# Patient Record
Sex: FEMALE | Race: BLACK OR AFRICAN AMERICAN | NOT HISPANIC OR LATINO | Employment: UNEMPLOYED | URBAN - METROPOLITAN AREA
[De-identification: names, ages, dates, MRNs, and addresses within clinical notes are randomized per-mention and may not be internally consistent; named-entity substitution may affect disease eponyms.]

---

## 2019-08-28 ENCOUNTER — HOSPITAL ENCOUNTER (EMERGENCY)
Facility: CLINIC | Age: 49
Discharge: HOME | End: 2019-08-28
Attending: EMERGENCY MEDICINE | Admitting: EMERGENCY MEDICINE
Payer: COMMERCIAL

## 2019-08-28 VITALS
TEMPERATURE: 98.4 F | OXYGEN SATURATION: 100 % | SYSTOLIC BLOOD PRESSURE: 100 MMHG | RESPIRATION RATE: 18 BRPM | DIASTOLIC BLOOD PRESSURE: 61 MMHG | HEART RATE: 65 BPM

## 2019-08-28 DIAGNOSIS — R42 DIZZINESS: ICD-10-CM

## 2019-08-28 DIAGNOSIS — R42 VERTIGO: ICD-10-CM

## 2019-08-28 LAB
ABO + RH BLD: NORMAL
ABO + RH BLD: NORMAL
ANION GAP SERPL CALCULATED.3IONS-SCNC: 6 MMOL/L (ref 3–14)
BASOPHILS # BLD AUTO: 0 10E9/L (ref 0–0.2)
BASOPHILS NFR BLD AUTO: 0.5 %
BLD GP AB SCN SERPL QL: NORMAL
BLOOD BANK CMNT PATIENT-IMP: NORMAL
BUN SERPL-MCNC: 9 MG/DL (ref 7–30)
CALCIUM SERPL-MCNC: 8.7 MG/DL (ref 8.5–10.1)
CHLORIDE SERPL-SCNC: 108 MMOL/L (ref 94–109)
CO2 SERPL-SCNC: 25 MMOL/L (ref 20–32)
CREAT SERPL-MCNC: 0.54 MG/DL (ref 0.52–1.04)
DIFFERENTIAL METHOD BLD: NORMAL
EOSINOPHIL # BLD AUTO: 0.5 10E9/L (ref 0–0.7)
EOSINOPHIL NFR BLD AUTO: 6.9 %
ERYTHROCYTE [DISTWIDTH] IN BLOOD BY AUTOMATED COUNT: 12.6 % (ref 10–15)
GFR SERPL CREATININE-BSD FRML MDRD: >90 ML/MIN/{1.73_M2}
GLUCOSE SERPL-MCNC: 100 MG/DL (ref 70–99)
HCG SERPL QL: NEGATIVE
HCT VFR BLD AUTO: 38.2 % (ref 35–47)
HGB BLD-MCNC: 12.3 G/DL (ref 11.7–15.7)
IMM GRANULOCYTES # BLD: 0 10E9/L (ref 0–0.4)
IMM GRANULOCYTES NFR BLD: 0.3 %
INTERPRETATION ECG - MUSE: NORMAL
LYMPHOCYTES # BLD AUTO: 2.5 10E9/L (ref 0.8–5.3)
LYMPHOCYTES NFR BLD AUTO: 31.2 %
MCH RBC QN AUTO: 27.9 PG (ref 26.5–33)
MCHC RBC AUTO-ENTMCNC: 32.2 G/DL (ref 31.5–36.5)
MCV RBC AUTO: 87 FL (ref 78–100)
MONOCYTES # BLD AUTO: 0.6 10E9/L (ref 0–1.3)
MONOCYTES NFR BLD AUTO: 8 %
NEUTROPHILS # BLD AUTO: 4.2 10E9/L (ref 1.6–8.3)
NEUTROPHILS NFR BLD AUTO: 53.1 %
NRBC # BLD AUTO: 0 10*3/UL
NRBC BLD AUTO-RTO: 0 /100
PLATELET # BLD AUTO: 282 10E9/L (ref 150–450)
POTASSIUM SERPL-SCNC: 3.8 MMOL/L (ref 3.4–5.3)
RBC # BLD AUTO: 4.41 10E12/L (ref 3.8–5.2)
SODIUM SERPL-SCNC: 139 MMOL/L (ref 133–144)
SPECIMEN EXP DATE BLD: NORMAL
TROPONIN I SERPL-MCNC: <0.015 UG/L (ref 0–0.04)
TSH SERPL DL<=0.005 MIU/L-ACNC: 3.29 MU/L (ref 0.4–4)
WBC # BLD AUTO: 7.9 10E9/L (ref 4–11)

## 2019-08-28 PROCEDURE — 86900 BLOOD TYPING SEROLOGIC ABO: CPT | Performed by: EMERGENCY MEDICINE

## 2019-08-28 PROCEDURE — 80048 BASIC METABOLIC PNL TOTAL CA: CPT | Performed by: EMERGENCY MEDICINE

## 2019-08-28 PROCEDURE — 93005 ELECTROCARDIOGRAM TRACING: CPT

## 2019-08-28 PROCEDURE — 84443 ASSAY THYROID STIM HORMONE: CPT | Performed by: EMERGENCY MEDICINE

## 2019-08-28 PROCEDURE — 25000132 ZZH RX MED GY IP 250 OP 250 PS 637: Performed by: EMERGENCY MEDICINE

## 2019-08-28 PROCEDURE — 96361 HYDRATE IV INFUSION ADD-ON: CPT

## 2019-08-28 PROCEDURE — 96374 THER/PROPH/DIAG INJ IV PUSH: CPT

## 2019-08-28 PROCEDURE — 99284 EMERGENCY DEPT VISIT MOD MDM: CPT | Mod: 25

## 2019-08-28 PROCEDURE — 84703 CHORIONIC GONADOTROPIN ASSAY: CPT | Performed by: EMERGENCY MEDICINE

## 2019-08-28 PROCEDURE — 86901 BLOOD TYPING SEROLOGIC RH(D): CPT | Performed by: EMERGENCY MEDICINE

## 2019-08-28 PROCEDURE — 25000128 H RX IP 250 OP 636: Performed by: EMERGENCY MEDICINE

## 2019-08-28 PROCEDURE — 85025 COMPLETE CBC W/AUTO DIFF WBC: CPT | Performed by: EMERGENCY MEDICINE

## 2019-08-28 PROCEDURE — 86850 RBC ANTIBODY SCREEN: CPT | Performed by: EMERGENCY MEDICINE

## 2019-08-28 PROCEDURE — 84484 ASSAY OF TROPONIN QUANT: CPT | Performed by: EMERGENCY MEDICINE

## 2019-08-28 RX ORDER — ONDANSETRON 2 MG/ML
4 INJECTION INTRAMUSCULAR; INTRAVENOUS ONCE
Status: COMPLETED | OUTPATIENT
Start: 2019-08-28 | End: 2019-08-28

## 2019-08-28 RX ORDER — MECLIZINE HYDROCHLORIDE 25 MG/1
50 TABLET ORAL ONCE
Status: COMPLETED | OUTPATIENT
Start: 2019-08-28 | End: 2019-08-28

## 2019-08-28 RX ORDER — MECLIZINE HYDROCHLORIDE 25 MG/1
25-50 TABLET ORAL 3 TIMES DAILY PRN
Qty: 30 TABLET | Refills: 1 | Status: SHIPPED | OUTPATIENT
Start: 2019-08-28

## 2019-08-28 RX ORDER — ONDANSETRON 4 MG/1
4 TABLET, ORALLY DISINTEGRATING ORAL EVERY 8 HOURS PRN
Qty: 10 TABLET | Refills: 0 | Status: SHIPPED | OUTPATIENT
Start: 2019-08-28 | End: 2022-02-07

## 2019-08-28 RX ADMIN — SODIUM CHLORIDE 1000 ML: 9 INJECTION, SOLUTION INTRAVENOUS at 07:28

## 2019-08-28 RX ADMIN — ONDANSETRON HYDROCHLORIDE 4 MG: 2 INJECTION, SOLUTION INTRAMUSCULAR; INTRAVENOUS at 07:28

## 2019-08-28 RX ADMIN — MECLIZINE HYDROCHLORIDE 50 MG: 25 TABLET ORAL at 07:30

## 2019-08-28 ASSESSMENT — ENCOUNTER SYMPTOMS
FEVER: 0
DYSURIA: 0
DIARRHEA: 0
ABDOMINAL PAIN: 0
VOMITING: 0
DIZZINESS: 1
SHORTNESS OF BREATH: 1
WEAKNESS: 1

## 2019-08-28 NOTE — ED AVS SNAPSHOT
[facility] Emergency Department  [address]  [facility] [address]  Phone:  [phone]  Fax:  [phone]                                    Frankie Elliott   MRN: [id number]    Department:  [facility] Emergency Department   Date of Visit:  8/28/2019           After Visit Summary Signature Page    I have received my discharge instructions, and my questions have been answered. I have discussed any challenges I see with this plan with the nurse or doctor.    ..........................................................................................................................................  Patient/Patient Representative Signature      ..........................................................................................................................................  Patient Representative Print Name and Relationship to Patient    ..................................................               ................................................  Date                                   Time    ..........................................................................................................................................  Reviewed by Signature/Title    ...................................................              ..............................................  Date                                               Time          22EPIC Rev 08/18

## 2019-08-28 NOTE — ED PROVIDER NOTES
History     Chief Complaint:  Dizziness      HPI   Frankie Elliott is a 49 year old female who presents with Dizziness.       Allergies:  No known drug allergies      Medications:    Meclizine  Naproxen     Past Medical History:    Vertigo    Past Surgical History:    The patient does not have any pertinent past surgical history.     Family History:    No past pertinent family history.     Social History:  Smoking status: Not on file  Alcohol use: Not on file   Drug use: Not on file   PCP: No primary care provider on file.  Presents to the ED with ***  Marital Status:         Review of Systems  ***    Physical Exam     Patient Vitals for the past 24 hrs:   BP Temp Temp src Pulse Heart Rate Resp SpO2   08/28/19 0639 96/66 98.4  F (36.9  C) Oral 78 78 18 100 %        Physical Exam  ***  Constitutional: Patient is well appearing. No distress.  Head: Atraumatic.  Mouth/Throat: Oropharynx is clear and moist. No oropharyngeal exudate.  Eyes: Conjunctivae and EOM are normal. No scleral icterus.  Neck: Normal range of motion. Neck supple.   Cardiovascular: Normal rate, regular rhythm, normal heart sounds and intact distal pulses.   Pulmonary/Chest: Breath sounds normal. No respiratory distress.  Abdominal: Soft. Bowel sounds are normal. No distension. No tenderness. No rebound or guarding.   Musculoskeletal: Normal range of motion. No edema or tenderness.   Neurological: Alert and orientated to person, place, and time. No observable focal neuro deficit  Skin: Warm and dry. No rash noted. Not diaphoretic.       Emergency Department Course     ECG:  ECG taken at 0650, ECG read at 0650  Normal sinus rhythm  Low voltage QRS  Borderline ECG  Rate 73 bpm. NC interval 150 ms. QRS duration 70 ms. QT/QTc 384/423 ms. P-R-T axes 45 40 23.    Imaging:  Radiology findings were communicated with the *** who voiced understanding of the findings.    No orders to display     Laboratory:  Laboratory findings were communicated with the ***  who voiced understanding of the findings.    ***  CBC: WBC ***, HGB ***, PLT ***  ***MP: *** o/w WNL (Creatinine ***)  ***    Procedures  ***    Interventions:  ***  Medications   0.9% sodium chloride BOLUS (has no administration in time range)     Emergency Department Course:   Nursing notes and vitals reviewed.    *** I performed an exam of the patient as documented above.     *** ***    *** ***    *** I personally reviewed the *** results with the *** and answered all related questions prior to ***.    Impression & Plan      Medical Decision Making:  Frankie Elliott is a 49 year old female who presents to the emergency department today for evaluation of ***    Diagnosis:  ***  No diagnosis found.  Disposition:   ***    Discharge Medications:  ***  New Prescriptions    No medications on file       Scribe Disclosure:  I, Margie Myers, am serving as a scribe at *** on 8/28/2019 to document services personally performed by Calixto Cr MD based on my observations and the provider's statements to me.  [facility] EMERGENCY DEPARTMENT

## 2019-08-28 NOTE — ED TRIAGE NOTES
Dizziness started about 1 month ago, comes and goes. Palpitations when feeling dizzy. ABCs intact.

## 2019-08-28 NOTE — DISCHARGE INSTRUCTIONS
Discharge Instructions  Vertigo  You have been diagnosed with vertigo.  This is a dizzy feeling often described as spinning or that the room is moving around you. You will often have nausea (sick to your stomach), vomiting (throwing up), and balance problems with it.  Vertigo is usually caused by a problem in the inner ear which helps control your balance.  Many things can cause vertigo, including calcium collections in the inner ear, a virus infection of the inner ear, concussion, migraine, and some medicines.  Luckily, these causes are not life threatening and will eventually go away.  However, sometimes there is a serious problem that does not show up right away.  Generally, every Emergency Department visit should have a follow-up clinic visit with either a primary or a specialty clinic/provider. Please follow-up as instructed by your emergency provider today.  Return to the Emergency Department if you have:  New or severe headache.  Double vision (seeing two of things).  Trouble speaking or hearing.  Weakness or trouble moving/using one side of your body.  Passing out.  Numbness or tingling.  Chest pain.  Vomiting that will not stop.    Treatment:  There are several commonly prescribed medications:  Antihistamines such as meclizine (Antivert ), dimenhydrinate (Dramamine ), or diphenhydramine (Benadryl ).  Prescription anti-nausea medicines, such as promethazine (Phenergan ), metoclopramide (Reglan ), or ondansetron (Zofran ).  Prescription sedative medicines, such as diazepam (Valium ), lorazepam (Ativan ), or clonazepam (Klonopin ).  Most of these medicines make you sleepy, and you should not take them before you work or drive. You should only take prescription medicines to treat severe vertigo symptoms, and you should stop the medicine when your symptoms improve.    Follow Up:  If you have vertigo longer than three days, it is important that you follow up either with your primary provider or an Ear, Nose, and  Throat (ENT) specialist.  You may need further testing to evaluate your vertigo and you may also need  vestibular  therapy which is a special form of physical therapy to make the vertigo go away.    If you were given a prescription for medicine here today, be sure to read all of the information (including the package insert) that comes with your prescription.  This will include important information about the medicine, its side effects, and any warnings that you need to know about.  The pharmacist who fills the prescription can provide more information and answer questions you may have about the medicine.  If you have questions or concerns that the pharmacist cannot address, please call or return to the Emergency Department.     Remember that you can always come back to the Emergency Department if you are not able to see your regular provider in the amount of time listed above, if you get any new symptoms, or if there is anything that worries you.

## 2019-08-28 NOTE — ED PROVIDER NOTES
History     Chief Complaint:  Dizziness    The history is provided by the patient. The history is limited by a language barrier.  used: offered, declined,  interpreted.      Frankie Elliott is a 49 year old female with a history of vertigo who presents with dizziness. Per chart review, patient was admitted in [place] in Feb for dizziness and weakness. Hemoglobin was low at this time and she was discharged on oral iron supplementation and meclizine. Today, patient reports roughly 15-20 minutes of dizziness, described as the room spinning, worse with movement, with associated weakness and some shortness of breath. Her symptoms are improved from hospitalization in Feb. No chest pain, abdominal pain, vomiting, diarrhea, dysuria, or fevers. She took PO iron and multivitamin this morning prior to arrival.     Allergies:  No Known Drug Allergies      Medications:    Ferrous sulfate     Past Medical History:    Vertigo  Iron deficiency anemia    Past Surgical History:    Surgical history reviewed. No pertinent surgical history.     Family History:    Family history reviewed. No pertinent family history.     Social History:  The patient was accompanied to the ED by .  Smoking Status: Never Smoker  Smokeless Tobacco: Never Used  Alcohol Use: Negative    Marital Status:      Review of Systems   Constitutional: Negative for fever.   Respiratory: Positive for shortness of breath.    Cardiovascular: Negative for chest pain.   Gastrointestinal: Negative for abdominal pain, diarrhea and vomiting.   Genitourinary: Negative for dysuria.   Neurological: Positive for dizziness and weakness.   All other systems reviewed and are negative.    Physical Exam     Patient Vitals for the past 24 hrs:   BP Temp Temp src Pulse Heart Rate Resp SpO2   08/28/19 0639 96/66 98.4  F (36.9  C) Oral 78 78 18 100 %     Physical Exam  General: Alert, appears well-developed and well-nourished. Cooperative.     In  mild distress  HEENT:  Head:  Atraumatic  Ears:  External ears are normal  Mouth/Throat:  Oropharynx is without erythema or exudate and mucous membranes are moist.  Eyes:   Conjunctivae normal and EOM are normal. No scleral icterus.    Pupils are equal, round, and reactive to light.   Neck:   Normal range of motion. Neck supple.  CV:  Normal rate, regular rhythm, normal heart sounds and radial pulses are 2+ and symmetric.  No murmur.  Resp:  Breath sounds are clear bilaterally    Non-labored, no retractions or accessory muscle use  GI:  Abdomen is soft, no distension, no tenderness. No rebound or guarding. No CVA tenderness bilaterally  MS:  Normal range of motion. No edema.    Normal strength in all 4 extremities.     Back atraumatic.    No midline cervical, thoracic, or lumbar tenderness  Skin:  Warm and dry.  No rash or lesions noted.  Neuro: Alert. Normal strength. Sensation intact in all 4 extremities. GCS: 15  Psych:  Normal mood and affect.    Emergency Department Course     ECG:  ECG taken at 0650, ECG read at 0811  Normal sinus rhythm  Low voltage QRS   Borderline ECG  Rate 73 bpm. [place] interval 150 ms. QRS duration 70 ms. QT/QTc 384/423 ms. P-R-T axes 45 40 23.    Laboratory:  Laboratory findings were communicated with the patient who voiced understanding of the findings.    CBC: WBC 7.9, HGB 12.3,   BMP: Glucose 100(H) o/w WNL (Creatinine 0.54)  Troponin (Collected 0718): <0.015  TSH with free T4 reflex: 3.29   HCG qualitative pregnancy: Negative   ABO/Rh type and screen: ABO: O, Rh(D): Pos, Antibody Screen: Neg     Interventions:  0728 NS 1,000 mL IV  0728 Zofran 4 mg IV  0730 Antivert 50 mg Oral     Emergency Department Course:    0650 EKG taken as noted above.     0659 Nursing notes and vitals reviewed.    0702 I performed an exam of the patient as documented above.     0718 IV was inserted and blood was drawn for laboratory testing, results above.    0831 Rechecked and updated.      0845 Findings  and plan explained to the Patient. Patient discharged home with instructions regarding supportive care, medications, and reasons to return. The importance of close follow-up was reviewed. The patient was prescribed Zofran and meclizine.     Impression & Plan      Medical Decision Making:  Frankie Elliott is a 49 year old female who presents for evaluation of vertigo. The differential diagnosis of vertigo is broad and includes common etiologies such as meniere's disease, labyrinthitis, benign positional vertigo, otitis media, etc. More serious etiologies considered include central etiologies such as tumor, intracerebral bleed, dissection, ischemic cerebral vascular accident.  The patient has no significant risk factors for stroke and the history, physical exam including detailed neurologic exam, and workup in the emergency room suggests a benign cause of vertigo today. Patient feels improved after interventions noted above and was able to ambulate without difficulty.  Further outpatient management is indicated with vertigo medications. There were no indication for advanced imaging at this point (CT/MRI) as there are no definite signs of a central and concerning etiology for the vertigo. Vertigo precautions given for home. Additionally, we faxed a referral and recommended vestibular rehabilitation in follow-up.    Diagnosis:    ICD-10-CM    1. Dizziness R42 PHYSICAL THERAPY REFERRAL   2. Vertigo R42 PHYSICAL THERAPY REFERRAL     Disposition:   The patient is discharged to home.    Discharge Medications:  meclizine 25 MG tablet  Commonly known as:  ANTIVERT      Dose:  25-50 mg  Take 1-2 tablets (25-50 mg) by mouth 3 times daily as needed for dizziness or nausea  Quantity:  30 tablet  Refills:  1     ondansetron 4 MG ODT tab  Commonly known as:  ZOFRAN ODT      Dose:  4 mg  Take 1 tablet (4 mg) by mouth every 8 hours as needed for nausea  Quantity:  10 tablet  Refills:  0       Scribe Disclosure:  Mariangel ROLAND  am serving as a scribe at 7:01 AM on 8/28/2019 to document services personally performed by Calixto Cr MD based on my observations and the provider's statements to me.       Calixto Cr MD  08/28/19 0814

## 2019-09-19 ENCOUNTER — HOSPITAL ENCOUNTER (OUTPATIENT)
Dept: PHYSICAL THERAPY | Facility: CLINIC | Age: 49
Setting detail: THERAPIES SERIES
End: 2019-09-19
Attending: EMERGENCY MEDICINE
Payer: COMMERCIAL

## 2019-09-19 DIAGNOSIS — R42 VERTIGO: ICD-10-CM

## 2019-09-19 DIAGNOSIS — R42 DIZZINESS: ICD-10-CM

## 2019-09-19 NOTE — PROGRESS NOTES
09/19/19 0900   Quick Adds   Quick Adds Vestibular Eval   Type of Visit Initial OP PT Evaluation       Present No;Yes  (pt's spouse acting as  today)   Language Nigerien   General Information   Start of Care Date 09/19/19   Referring Physician Calixto Cr MD    Orders Evaluate and Treat as Indicated   Order Date 08/28/19   Medical Diagnosis Dizziness R42 Vertigo R42    Onset of illness/injury or Date of Surgery   (pt reports onset of dizziness 7 years ago (2012))   Precautions/Limitations no known precautions/limitations   Surgical/Medical history reviewed Yes   Pertinent history of current problem (include personal factors and/or comorbidities that impact the POC) 50 yo F arrives for vestibular PT evaluation of dizziness, refered after ED visit for dizziness on 8/28/19 (approx 3 weeks ago). Symptomes last [id number] minutes at a time and can recur for 1-2 weeks at a time and then stop. She can go a couple of years sxs-free or it can happen 3x in a year. In February she was admitted in [facility] for dizziness and weakness. This was a severe episode. Her Hemoglobin was low at the time and she was discharged with iron supplimentation and Meclizine. Dizziness recurred again 3 weeks ago the day she went to the ED. Described as feeling the room spinning but denies visual vertigo. Reports unable to move during these episodes and slowly lays down to feel better. Symptoms of dizziness can come on when sitting still. Unable to identify any precceding event or provoking movements that trigger sxs. She is affraid of falling when sxs of dizziness occur but she has not had a fall. Reports she has associated pressure in both ears with dizziness. Denies any tinnitus or hearing loss. Also reports she has neck pain as well that only occurs with dizziness. Denies any hx of migraines or HA. Has not been seen by ENT or PT in the past for dizziness. Denies any hx of neck pain or injury. Currently  asyptomatic. Has felt normal since d/c from ED 3 weeks ago. Perscribed Meclizine again but hasnt been taken it because she has been feeling good.    Pertinent Visual History  does not wear corrective eyewear, has been advised by optomitrist to wear glasses.    Prior level of function comment Independent mobility, self cares.   Current Community Support Family/friend caregiver  (spouse)   Patient role/Employment history Unemployed   Patient/Family Goals Statement Understand what else could be causing dizziness   Fall Risk Screen   Fall screen completed by PT   Have you fallen 2 or more times in the past year? No   Have you fallen and had an injury in the past year? No   Timed Up and Go score (seconds) N/T (see 4 item DGI)   Is patient a fall risk? No   Fall screen comments 4 item DGI 12/12   Abuse Screen (yes response referral indicated)   Feels Unsafe at Home or Work/School no   Feels Threatened by Someone no   Does Anyone Try to Keep You From Having Contact with Others or Doing Things Outside Your Home? no   Physical Signs of Abuse Present no   Pain   Patient currently in pain No   Pain comments 2 years of low back pain without recent change worse with lifting, no current LBP   Cognitive Status Examination   Orientation orientation to person, place and time   Level of Consciousness alert   Follows Commands and Answers Questions 100% of the time   Personal Safety and Judgment intact   Bed Mobility   Bed Mobility Comments indep   Transfer Skills   Transfer Comments indep   Gait   Gait Comments over short distances indoors symetrical gait without path deviations or LOB   Gait Special Tests   Gait Special Tests 25 FOOT TIMED WALK;DYNAMIC GAIT INDEX   Gait Special Tests 25 Foot Timed Walk   Comments 1.03 m/s (WNL)   Gait Special Tests Dynamic Gait Index   Comments 4 item DGI: 12/12   Balance Special Tests   Balance Special Tests Modified CTSIB Conditions   Balance Special Tests Modified CTSIB Conditions   Condition 1,  "seconds 30 Seconds   Condition 2, seconds 30 Seconds   Condition 4, seconds 30 Seconds   Condition 5, seconds 8.6 Seconds  (4, 10, 2 falls into wall or therapist R/L)   Modified CTSIB Comments reports dizziness provoked when eyes closed that resolved once eyes opened (which is not the case when she experiences the dizziness that she experiences that brings her to the ED)   Sensory Examination   Sensory Perception Comments denies n/t   Coordination   Coordination no deficits were identified   Cervicogenic Screen   Neck ROM WFL   Oculomotor Exam   Smooth Pursuit Normal   Saccades Normal   VOR Normal   VOR Comments assessed with 14 pt font letter arms length head rotations 2 Hz, denies any doubled vision, denies any dizzines following assessment   Rapid Head Thrust Normal   Convergence Testing Normal   Infrared Goggle Exam or Frenzel Lense Exam   Vestibular Suppressant in Last 24 Hours? No   Exam completed with Infrared Goggles   Spontaneous Nystagmus Negative   Gaze Evoked Nystagmus Negative   Head Shake Horizontal Nystagmus Negative   Positional testing Negative   [place]-Hallpike (right) Negative   [place]-Hallpike (Left) Negative   HSCC Supine Roll Test (Right) Negative   HSCC Supine Roll Test (Left) Negative   Dynamic Visual Acuity (DVA)   Static Acuity (LogMar) 20/30   Clinical Impression   Criteria for Skilled Therapeutic Interventions Met no problems identified which require skilled intervention;evaluation only   Risk & Benefits of therapy have been explained Yes   Patient, Family & other staff in agreement with plan of care Yes   Clinical Impression Comments Vestibular assessement reveals intact vestibular system. No nystagmus observed throughout assessment. Pt reports \"dizziness\" provoked during postural stability assessment with feet together on foam cushion eyes closed. Pt quick to loose her balance and reports dizziness immediatly resolves once she opens her eyes, which is not the case when she is experiencing her " attacks of vertigo. Recommended postural stability exercises for HEP that pt is not interested in.  Pt is not considered inc risk for falls based on today's postural or gait stability assessments, however she is also not experiencing a vertigo attack. Also reports some bilateral ear pressure and neck strain assoicated with vertigo. However, this therapist suspects less likely vestibular or cervicogenic dizziness based on today's assessment. Therapist recommending pt follow-up with MD with any recurrance of sxs. She is appropriate for d/c from PT at this time without treatment needed.   Total Evaluation Time   PT Flynn, Low Complexity Minutes ([id number]) 45

## 2020-05-26 ENCOUNTER — APPOINTMENT (OUTPATIENT)
Dept: ULTRASOUND IMAGING | Facility: CLINIC | Age: 50
End: 2020-05-26
Attending: EMERGENCY MEDICINE
Payer: COMMERCIAL

## 2020-05-26 ENCOUNTER — HOSPITAL ENCOUNTER (EMERGENCY)
Facility: CLINIC | Age: 50
Discharge: HOME | End: 2020-05-26
Attending: EMERGENCY MEDICINE | Admitting: EMERGENCY MEDICINE
Payer: COMMERCIAL

## 2020-05-26 VITALS
TEMPERATURE: 98.2 F | RESPIRATION RATE: 18 BRPM | SYSTOLIC BLOOD PRESSURE: 111 MMHG | OXYGEN SATURATION: 100 % | DIASTOLIC BLOOD PRESSURE: 72 MMHG

## 2020-05-26 DIAGNOSIS — O20.0 THREATENED MISCARRIAGE IN EARLY PREGNANCY: ICD-10-CM

## 2020-05-26 LAB
ABO + RH BLD: NORMAL
ABO + RH BLD: NORMAL
ANION GAP SERPL CALCULATED.3IONS-SCNC: 5 MMOL/L (ref 3–14)
B-HCG SERPL-ACNC: 9880 IU/L (ref 0–5)
BASOPHILS # BLD AUTO: 0 10E9/L (ref 0–0.2)
BASOPHILS NFR BLD AUTO: 0.2 %
BLD GP AB SCN SERPL QL: NORMAL
BLOOD BANK CMNT PATIENT-IMP: NORMAL
BUN SERPL-MCNC: 7 MG/DL (ref 7–30)
CALCIUM SERPL-MCNC: 8.8 MG/DL (ref 8.5–10.1)
CHLORIDE SERPL-SCNC: 105 MMOL/L (ref 94–109)
CO2 SERPL-SCNC: 26 MMOL/L (ref 20–32)
CREAT SERPL-MCNC: 0.61 MG/DL (ref 0.52–1.04)
DIFFERENTIAL METHOD BLD: ABNORMAL
EOSINOPHIL # BLD AUTO: 0.2 10E9/L (ref 0–0.7)
EOSINOPHIL NFR BLD AUTO: 2.6 %
ERYTHROCYTE [DISTWIDTH] IN BLOOD BY AUTOMATED COUNT: 13.2 % (ref 10–15)
GFR SERPL CREATININE-BSD FRML MDRD: >90 ML/MIN/{1.73_M2}
GLUCOSE SERPL-MCNC: 125 MG/DL (ref 70–99)
HCT VFR BLD AUTO: 36.9 % (ref 35–47)
HGB BLD-MCNC: 11.6 G/DL (ref 11.7–15.7)
IMM GRANULOCYTES # BLD: 0 10E9/L (ref 0–0.4)
IMM GRANULOCYTES NFR BLD: 0.2 %
LYMPHOCYTES # BLD AUTO: 2.3 10E9/L (ref 0.8–5.3)
LYMPHOCYTES NFR BLD AUTO: 26.1 %
MCH RBC QN AUTO: 26.8 PG (ref 26.5–33)
MCHC RBC AUTO-ENTMCNC: 31.4 G/DL (ref 31.5–36.5)
MCV RBC AUTO: 85 FL (ref 78–100)
MONOCYTES # BLD AUTO: 0.5 10E9/L (ref 0–1.3)
MONOCYTES NFR BLD AUTO: 5.7 %
NEUTROPHILS # BLD AUTO: 5.6 10E9/L (ref 1.6–8.3)
NEUTROPHILS NFR BLD AUTO: 65.2 %
NRBC # BLD AUTO: 0 10*3/UL
NRBC BLD AUTO-RTO: 0 /100
PLATELET # BLD AUTO: 321 10E9/L (ref 150–450)
POTASSIUM SERPL-SCNC: 3.5 MMOL/L (ref 3.4–5.3)
RBC # BLD AUTO: 4.33 10E12/L (ref 3.8–5.2)
SODIUM SERPL-SCNC: 136 MMOL/L (ref 133–144)
SPECIMEN EXP DATE BLD: NORMAL
WBC # BLD AUTO: 8.6 10E9/L (ref 4–11)

## 2020-05-26 PROCEDURE — 84702 CHORIONIC GONADOTROPIN TEST: CPT | Performed by: EMERGENCY MEDICINE

## 2020-05-26 PROCEDURE — 86900 BLOOD TYPING SEROLOGIC ABO: CPT | Performed by: EMERGENCY MEDICINE

## 2020-05-26 PROCEDURE — 86850 RBC ANTIBODY SCREEN: CPT | Performed by: EMERGENCY MEDICINE

## 2020-05-26 PROCEDURE — 85025 COMPLETE CBC W/AUTO DIFF WBC: CPT | Performed by: EMERGENCY MEDICINE

## 2020-05-26 PROCEDURE — 80048 BASIC METABOLIC PNL TOTAL CA: CPT | Performed by: EMERGENCY MEDICINE

## 2020-05-26 PROCEDURE — 86901 BLOOD TYPING SEROLOGIC RH(D): CPT | Performed by: EMERGENCY MEDICINE

## 2020-05-26 PROCEDURE — 76817 TRANSVAGINAL US OBSTETRIC: CPT

## 2020-05-26 PROCEDURE — 99284 EMERGENCY DEPT VISIT MOD MDM: CPT | Mod: 25

## 2020-05-26 ASSESSMENT — ENCOUNTER SYMPTOMS
DIARRHEA: 0
NAUSEA: 0
ABDOMINAL PAIN: 0
CONSTIPATION: 0
VOMITING: 0
FEVER: 0

## 2020-05-26 NOTE — ED TRIAGE NOTES
Pt arrives for vaginal bleeding, states has been bleeding for 1 week. States is about 5 weeks pregnant. ABCs intact.

## 2020-05-26 NOTE — ED AVS SNAPSHOT
[facility] Emergency Department  [address]  [facility] [address]  Phone:  [phone]  Fax:  [phone]                                    Frankie Elliott   MRN: [id number]    Department:  [facility] Emergency Department   Date of Visit:  5/26/2020           After Visit Summary Signature Page    I have received my discharge instructions, and my questions have been answered. I have discussed any challenges I see with this plan with the nurse or doctor.    ..........................................................................................................................................  Patient/Patient Representative Signature      ..........................................................................................................................................  Patient Representative Print Name and Relationship to Patient    ..................................................               ................................................  Date                                   Time    ..........................................................................................................................................  Reviewed by Signature/Title    ...................................................              ..............................................  Date                                               Time          22EPIC Rev 08/18

## 2020-05-26 NOTE — ED PROVIDER NOTES
History     Chief Complaint:  Vaginal Bleeding    The history is provided by the patient. The history is limited by a language barrier. A  was used.      Frankie Elliott is a 50 year old,  Cymro-speaking female, approximately 5 weeks pregnant based on a last menstrual period on 2020--confirmed by two positive home pregnancy tests, who presents for evaluation of vaginal bleeding. She first noticed the bleeding 7 days ago; at first, she noticed black discharge, but then it became bright red blood. She has had intermittent pains, but none today. With concern for a miscarriage, she decided to present to the ED today. Here, she reports the bleeding only; she reports having changed pads about 4 times today. She has not been passing blood clots. She denies concern for STIs. She has not seen an OB yet with this pregnancy.     Allergies:  No Known Allergies     Medications:    Meclizine  Zofran   Ferrous sulfate    Past Medical History:    Chronic lower back pain  Vertigo   Anemia  Prediabetes  Pulmonary TB  Typhoid     Past Surgical History:    The patient does not have any pertinent past surgical history.    Family History:    Diabetes  Hypertension    Social History:  Marital Status:   [2]  Negative for tobacco use.  Negative for alcohol use.     Review of Systems   Constitutional: Negative for fever.   Gastrointestinal: Negative for abdominal pain, constipation, diarrhea, nausea and vomiting.   Genitourinary: Positive for vaginal bleeding. Negative for pelvic pain.   All other systems reviewed and are negative.    Physical Exam     Patient Vitals for the past 24 hrs:   BP Temp Temp src Heart Rate Resp SpO2   20 1746 111/72 98.2  F (36.8  C) Oral 104 18 100 %      Physical Exam  General: Resting comfortably on the gurney  Eyes:  The pupils are equal and round    Conjunctivae and sclerae are normal  ENT:    Moist mucous membranes  Neck:  Normal range of motion  CV:  Tachycardic  rate and rhythm    Skin warm and well perfused   Resp:  Non labored breathing on room air   GI:  Abdomen is soft, there is no rigidity    No distension    No rebound tenderness     No abdominal tenderness  :   Female nurse in room during exam. Mild amount of vaginal bleeding. No CMT  MS:  Normal muscular tone  Skin:  No rash or acute skin lesions noted  Neuro:   Awake, alert.      Speech is normal and fluent.    Face is symmetric.     Moves all extremities equally  Psych: Normal affect.  Appropriate interactions.    Emergency Department Course   Imaging:  Radiographic findings were communicated with the patient who voiced understanding of the findings.  US OB, 1st trimester w Transvaginal:  Single intrauterine pregnancy with estimated gestational age of 7 weeks 3 days. Heart activity is not detected which should be seen at this stage of pregnancy and concerning for fetal demise. A repeat exam in one week could be obtained for confirmation. Correlation should also be made with beta hCG levels, as per radiology.     Laboratory:  CBC: WBC: 8.6, HGB: 11.6 (L), PLT: 321   BMP: Glucose 125 (H), o/w WNL (Creatinine: 0.61)    HC (H)    Blood type & screen: O positive    Emergency Department Course:  Nursing notes and vitals reviewed.   IV was inserted and blood was drawn for laboratory testing, results above.     1851: I performed an exam of the patient as documented above.      1859: I performed a pelvic exam with a female chaperone at this time.     The patient was sent for an OB ultrasound while in the emergency department, results above.    2030: I rechecked the patient and discussed the results of her workup using an .     Findings and plan explained to the Patient. Patient discharged home with instructions regarding supportive care, medications, and reasons to return. The importance of close follow-up was reviewed. The patient was referred to her OB.     I personally reviewed the laboratory and  imaging results with the Patient and answered all related questions prior to discharge.    Impression & Plan      Medical Decision Making:  Frankie Elliott is a 50 year old female who presents for evaluation of first trimester vaginal bleeding. The workup here shows likely miscarriage.  She has no significant abdominal pain and mild bleeding.  Rh type is positive and so she does not need rhogam.  Ultrasound shows a single IUP at 7 weeks 3 days, but no heart activity was detected and at this stage of pregnancy this is concerning for fetal demise. Plan is home, close follow-up with OB next few days.  Threatened miscarriage precautions discussed and counseled to return to ED for worsening pain, heavy vaginal bleeding (more than 1 pad soaked every hour).  All questions were addressed.        Diagnosis:    ICD-10-CM    1. Threatened miscarriage in early pregnancy  O20.0        Disposition:  discharged to home    Scribe Disclosure:  I, Alejandrina Camarena, am serving as a scribe on 5/26/2020 at 6:51 PM to personally document services performed by Riya Taveras MD based on my observations and the provider's statements to me.     5/26/2020   [facility] EMERGENCY DEPARTMENT       Riya Taveras MD  05/27/20 0215

## 2020-05-27 NOTE — DISCHARGE INSTRUCTIONS
Follow up with your OB clinic- call tomorrow to make an appointment  I am concerned that your bleeding is from a miscarriage and bleeding may increase over next week  You need to be seen in the next week with OB for another ultrasound    Discharge Instructions  Vaginal Bleeding in Pregnancy    Bleeding in early pregnancy can be a sign of a miscarriage or an abnormal pregnancy, but often is innocent and the pregnancy will continue normally. We may do blood pregnancy tests and ultrasound to try to determine what is causing the bleeding, but sometimes we are unable to tell. If this is the case, it will often require more time, more blood tests, and another ultrasound.     Generally, every Emergency Department visit should have a follow-up clinic visit with either a primary or a specialty clinic/provider. Please follow-up as instructed by your emergency provider today.    Return to the Emergency Department if:  You have severe abdominal (belly) or pelvic pain.  You faint, or feel lightheaded or dizzy.   Your bleeding gets much worse.  You pass any tissue--solid material that does not look like a blood clot. If you pass tissue, save it (even if you have to pull it out of the toilet) and put it in a plastic bag or jar and bring it in.    You have a fever of 100.5 F or higher.  If no pregnancy could be seen:  It may be that everything is normal and it is just too early to see the pregnancy. It is also possible that you could have an ectopic pregnancy, which is a pregnancy in an abnormal location, such as in the tube ( tubal pregnancy ). We don t see evidence that this is likely today but we cannot exclude it with certainty until a pregnancy can be seen in the uterus (womb) and an ectopic pregnancy can cause severe internal bleeding or death so follow-up is very important.  You should not be alone, in case you suddenly become very sick.   You should not have sex or put anything in your vagina.     If a pregnancy was seen in  your uterus:  If a heartbeat could be seen, the chance of miscarriage is lower but because you had bleeding the chance of a miscarriage still exists.  You should not have sex or put anything in your vagina.  Follow-up as directed with your OB/GYN provider.     Facts about miscarriage: We hope you do not have a miscarriage, but if you do, here are important things to know:  Early miscarriage is very common, and having one miscarriage does not mean you will have problems with another pregnancy.  Nothing you did caused it. Taking medicine, drinking alcohol, having sex, exercising, or falling down will not cause a miscarriage.     If you were given a prescription for medicine here today, be sure to read all of the information (including the package insert) that comes with your prescription.  This will include important information about the medicine, its side effects, and any warnings that you need to know about.  The pharmacist who fills the prescription can provide more information and answer questions you may have about the medicine.  If you have questions or concerns that the pharmacist cannot address, please call or return to the Emergency Department.   Remember that you can always come back to the Emergency Department if you are not able to see your regular provider in the amount of time listed above, if you get any new symptoms, or if there is anything that worries you.

## 2022-01-05 ENCOUNTER — HOSPITAL ENCOUNTER (EMERGENCY)
Facility: CLINIC | Age: 52
Discharge: HOME | End: 2022-01-05
Attending: PHYSICIAN ASSISTANT | Admitting: PHYSICIAN ASSISTANT
Payer: COMMERCIAL

## 2022-01-05 VITALS
SYSTOLIC BLOOD PRESSURE: 104 MMHG | WEIGHT: 210 LBS | DIASTOLIC BLOOD PRESSURE: 66 MMHG | BODY MASS INDEX: 31.1 KG/M2 | OXYGEN SATURATION: 100 % | HEART RATE: 83 BPM | RESPIRATION RATE: 18 BRPM | HEIGHT: 69 IN | TEMPERATURE: 97.7 F

## 2022-01-05 DIAGNOSIS — M62.838 MUSCLE SPASM: ICD-10-CM

## 2022-01-05 DIAGNOSIS — M54.50 ACUTE LOW BACK PAIN: ICD-10-CM

## 2022-01-05 PROCEDURE — 99283 EMERGENCY DEPT VISIT LOW MDM: CPT

## 2022-01-05 RX ORDER — CYCLOBENZAPRINE HCL 10 MG
10 TABLET ORAL 3 TIMES DAILY PRN
Qty: 8 TABLET | Refills: 0 | Status: SHIPPED | OUTPATIENT
Start: 2022-01-05 | End: 2022-01-11

## 2022-01-05 RX ORDER — METHYLPREDNISOLONE 4 MG
TABLET, DOSE PACK ORAL
Qty: 21 TABLET | Refills: 0 | Status: SHIPPED | OUTPATIENT
Start: 2022-01-05 | End: 2022-02-07

## 2022-01-05 ASSESSMENT — ENCOUNTER SYMPTOMS
ABDOMINAL PAIN: 0
FEVER: 0
SHORTNESS OF BREATH: 0
BACK PAIN: 1

## 2022-01-05 ASSESSMENT — MIFFLIN-ST. JEOR: SCORE: 1636.93

## 2022-01-06 NOTE — DISCHARGE INSTRUCTIONS
Discharge Instructions  Back Pain  You were seen today for back pain. Back pain can have many causes, but most will get better without surgery or other specific treatment. Sometimes there is a herniated ( slipped ) disc. We do not usually do MRI scans to look for these right away, since most herniated discs will get better on their own with time.  Today, we did not find any evidence that your back pain was caused by a serious condition. However, sometimes symptoms develop over time and cannot be found during an emergency visit, so it is very important that you follow up with your primary provider.  Generally, every Emergency Department visit should have a follow-up clinic visit with either a primary or a specialty clinic/provider. Please follow-up as instructed by your emergency provider today.    Return to the Emergency Department if:  You develop a fever with your back pain.   You have weakness or change in sensation in one or both legs.  You lose control of your bowels or bladder, or cannot empty your bladder (cannot pee).  Your pain gets much worse.     Follow-up with your provider:  Unless your pain has completely gone away, please make an appointment with your provider within one week. Most of the routine care for back pain is available in a clinic and not the Emergency Department. You may need further management of your back pain, such as more pain medication, imaging such as an X-ray or MRI, or physical therapy.    What can I do to help myself?  Remain Active -- People are often afraid that they will hurt their back further or delay recovery by remaining active, but this is one of the best things you can do for your back. In fact, staying in bed for a long time to rest is not recommended. Studies have shown that people with low back pain recover faster when they remain active. Movement helps to bring blood flow to the muscles and relieve muscle spasms as well as preventing loss of muscle strength.  Heat --  Using a heating pad can help with low back pain during the first few weeks. Do not sleep with a heating pad, as you can be burned.   Pain medications - You may take a pain medication such as Tylenol  (acetaminophen), Advil , Motrin  (ibuprofen) or Aleve  (naproxen).  If you were given a prescription for medicine here today, be sure to read all of the information (including the package insert) that comes with your prescription.  This will include important information about the medicine, its side effects, and any warnings that you need to know about.  The pharmacist who fills the prescription can provide more information and answer questions you may have about the medicine.  If you have questions or concerns that the pharmacist cannot address, please call or return to the Emergency Department.   Remember that you can always come back to the Emergency Department if you are not able to see your regular provider in the amount of time listed above, if you get any new symptoms, or if there is anything that worries you.

## 2022-01-06 NOTE — ED TRIAGE NOTES
Here for left lower back pain radiating down leg, cramping like symptoms ongoing for 4 days. Tried OTC medications, OTC patches, and massage, but not helping. Sitting down or bending down worsens pain. ABCs intact.

## 2022-01-06 NOTE — ED PROVIDER NOTES
"  History     Chief Complaint:  Back Pain      HPI   Frankie Elliott is a 52 year old female who presents to the emergency department for evaluation of low back pain.  The patient reports that she was bending down to  a heavy object 4 days ago when she felt a pull in her low back.  She states that shortly thereafter, she began to develop low back pain.  She states that the pain begins in her back with intermittent radiation into her left leg.  At the time of the exam, the patient denied chest pain, shortness of breath, bowel or bladder incontinence, urinary retention, saddle anesthesia,weakness,  fever, or any other medical concerns.     Review of Systems   Constitutional: Negative for fever.   Respiratory: Negative for shortness of breath.    Cardiovascular: Negative for chest pain.   Gastrointestinal: Negative for abdominal pain.   Musculoskeletal: Positive for back pain.   All other systems reviewed and are negative.    Allergies:  No Known Allergies      Medications:    cyclobenzaprine (FLEXERIL) 10 MG tablet  methylPREDNISolone (MEDROL DOSEPAK) 4 MG tablet therapy pack  meclizine (ANTIVERT) 25 MG tablet  ondansetron (ZOFRAN ODT) 4 MG ODT tab        Past Medical History:    No past medical history on file.  There are no problems to display for this patient.       Past Surgical History:    No past surgical history on file.    Family History:      No family history on file.    Social History:  Presents with son.   Denied tobacco use.     Physical Exam     Patient Vitals for the past 24 hrs:   BP Temp Temp src Pulse Resp SpO2 Height Weight   01/05/22 1914 104/66 97.7  F (36.5  C) Temporal 83 18 100 % 1.753 m (5' 9\") 95.3 kg (210 lb)       Physical Exam  Vitals signs and nursing note reviewed.   HENT:      Nose: Nose normal. No congestion or rhinorrhea.      Mouth/Throat:      Mouth: Mucous membranes are moist.      Pharynx: Oropharynx is clear. No oropharyngeal exudate or posterior oropharyngeal erythema. "   Eyes:      General: No scleral icterus.     Extraocular Movements: Extraocular movements intact.      Conjunctiva/sclera: Conjunctivae normal.      Pupils: Pupils are equal, round, and reactive to light.   Cardiovascular:      Rate and Rhythm: Regular rhythm. Normal Rate.     Pulses: Normal pulses.      Heart sounds: Normal heart sounds.   Pulmonary:      Effort: Pulmonary effort is normal.      Breath sounds: Normal breath sounds.   Abdominal:      General: Abdomen is flat. Bowel sounds are normal.      Palpations: Abdomen is soft.      Tenderness: There is no abdominal tenderness.   Musculoskeletal: Normal range of motion.  Reproducible left-sided lumbar paraspinal muscle tenderness.  No cervical, thoracic, or lumbar midline bony tenderness.  Skin:     General: Skin is warm and dry.  No rashes, lesions, or open areas on exposed skin.  Neurological:      Mental Status: Alert. Speech normal. Responds appropriately to questions.   Psychiatric:         Mood and Affect: Mood normal.         Behavior: Behavior normal.       Emergency Department Course       Imaging:  Declined by patient       Emergency Department Course:           Reviewed:  I reviewed nursing notes, vitals and past history    Assessments: I obtained history and examined the patient as noted above.              Interventions:  Declined by patient.     Disposition:  The patient was discharged to home.    Impression & Plan             Medical Decision Making:  Frankie Elliott is a 52 year old female who presents for the evaluation of back pain following. See HPI for details. Vitals were reviewed. On physical exam, the patient had left-sided lumbar paraspinal muscle tenderness.  Negative straight leg raise bilaterally.  The history, and physical exam findings, we did discuss the option for proceeding with x-ray imaging.  The patient deferred.  At this time there are no red flag symptoms to suggest CT and/or MRI. The patient has not had a fever, saddle  anesthesia, or bowel or bladder dysfunction.  There is no clinical evidence of cauda equina syndrome, discitis, spinal hematoma, epidural abscess, or any serious referred acute intra-abdominal or genitourinary process. Her neurovascular exam is normal and the patient's symptoms are consistent with a musculoskeletal strain with muscle spasms.  In the emergency department, the patient was offered ED intervention however she declined.  She was observed ambulating independently without difficulty therefore further work-up was deferred at this time.  She was discharged home in stable condition with a short course of muscle relaxer and Medrol Dosepak to use as directed.  Side effects and limitations while taking Flexeril were discussed.  Ice or heat to the back and stretching exercises were encouraged.  She was advised to refrain from heavy lifting, bending or twisting. Return if increasing pain, numbness, weakness, or bowel or bladder dysfunction.  She was advised to follow-up with her PCP in 1-2 days for reassessment. All questions and concerns were addressed prior to discharge.             Diagnosis:    ICD-10-CM    1. Acute low back pain  M54.50    2. Muscle spasm  M62.838        Discharge Medications:  Discharge Medication List as of 1/5/2022  8:24 PM      START taking these medications    Details   cyclobenzaprine (FLEXERIL) 10 MG tablet Take 1 tablet (10 mg) by mouth 3 times daily as needed for muscle spasms, Disp-8 tablet, R-0, E-Prescribe      methylPREDNISolone (MEDROL DOSEPAK) 4 MG tablet therapy pack Follow Package Directions, Disp-21 tablet, R-0, E-Prescribe                  Cheryl Turk PA-C  01/05/22 2100

## 2022-02-07 ENCOUNTER — HOSPITAL ENCOUNTER (EMERGENCY)
Facility: CLINIC | Age: 52
Discharge: HOME | End: 2022-02-07
Attending: EMERGENCY MEDICINE | Admitting: EMERGENCY MEDICINE
Payer: COMMERCIAL

## 2022-02-07 VITALS
OXYGEN SATURATION: 100 % | RESPIRATION RATE: 18 BRPM | DIASTOLIC BLOOD PRESSURE: 66 MMHG | HEART RATE: 81 BPM | TEMPERATURE: 98.5 F | SYSTOLIC BLOOD PRESSURE: 94 MMHG

## 2022-02-07 DIAGNOSIS — K04.7 DENTAL INFECTION: ICD-10-CM

## 2022-02-07 DIAGNOSIS — R68.84 PAIN IN LOWER JAW: ICD-10-CM

## 2022-02-07 DIAGNOSIS — K03.2 LOCALIZED EROSION OF TOOTH: ICD-10-CM

## 2022-02-07 PROCEDURE — 99283 EMERGENCY DEPT VISIT LOW MDM: CPT

## 2022-02-07 RX ORDER — PENICILLIN V POTASSIUM 500 MG/1
500 TABLET, FILM COATED ORAL 4 TIMES DAILY
Qty: 40 TABLET | Refills: 0 | Status: SHIPPED | OUTPATIENT
Start: 2022-02-07 | End: 2022-02-17

## 2022-02-07 NOTE — ED TRIAGE NOTES
Pt having left lower tooth pain for 3 days. Has not seen dentist yet, has appt set for Thursday. Pt endorses swallowing difficulty d/t pain. Slight swelling noted to left lower jaw.

## 2022-02-07 NOTE — ED PROVIDER NOTES
History     Chief Complaint:  Dental pain     The history is provided by the patient. The history is limited by a language barrier. A  was used (Tai[place]ese).      Frankie Elliott is a 52 year old female who presents with dental pain. The patient reports having left lower tooth pain for the past 3 days. She has not yet seen a dentist but has an appointment scheduled with her dentist for Thursday. She has been taking ibuprofen for her symptoms.    Review of Systems   HENT: Positive for dental problem.    All other systems reviewed and are negative.    Allergies:  The patient does not have any allergies    Medications:  Drisdol  Ferrous sulfate    Past Medical History:     Anemia  Vertigo    Family History:    Father: diabetes, hypertension  Mother: diabetes, hypertension    Social History:  Presents alone    Physical Exam     Patient Vitals for the past 24 hrs:   BP Temp Temp src Pulse Resp SpO2   02/07/22 0443 94/66 98.5  F (36.9  C) Oral 81 18 100 %     Physical Exam  Nursing note and vitals reviewed.  Constitutional: Cooperative.   HENT:   Mouth/Throat: Mucous membranes are normal.   Chronic appearing dental erosion to tooth #19. No surrounding abscess. Normal jaw opening on occlusion.  Pulmonary/Chest: Effort normal.   Neurological: Alert.   Skin: Skin is warm and dry.   Psychiatric: Normal mood and affect.     Emergency Department Course        Reviewed:  I reviewed nursing notes, vitals, past medical history and Care Everywhere    Assessments:  0450 I obtained history and examined the patient as noted above.    Disposition:  The patient was discharged to home.     Impression & Plan     Medical Decision Making:  Frankie Elliott is a 52 year old female presents with dental pain.  There is no abscess detected around the tooth amenable to incision and drainage.  The differential diagnosis includes: cracked tooth syndrome, pulpitis, sub-apical abscess, amongst others.  There is no evidence of  buccinator/canine space infections, significant facial swelling, or Syd's angina. There are no posterior pharyngeal space infections detected.  Follow up with a dentist/endodontist in the coming days is indicated for further work up and treatment. Instructions for return to the ER were reviewed with the patient.      Diagnosis:    ICD-10-CM    1. Localized erosion of tooth #19  K03.2    2. Dental infection  K04.7    3. Pain in lower jaw  R68.84        Discharge Medications:  New Prescriptions    PENICILLIN V (VEETID) 500 MG TABLET    Take 1 tablet (500 mg) by mouth 4 times daily for 10 days       Scribe Disclosure:  Osmin ROLAND, am serving as a scribe at 4:46 AM on 2/7/2022 to document services personally performed by Duy Gomez MD based on my observations and the provider's statements to me.         Duy Gomez MD  02/07/22 1718

## 2023-09-09 PROCEDURE — 99285 EMERGENCY DEPT VISIT HI MDM: CPT | Mod: 25

## 2023-09-10 ENCOUNTER — HOSPITAL ENCOUNTER (EMERGENCY)
Facility: CLINIC | Age: 53
Discharge: HOME | End: 2023-09-10
Attending: EMERGENCY MEDICINE | Admitting: EMERGENCY MEDICINE
Payer: COMMERCIAL

## 2023-09-10 ENCOUNTER — APPOINTMENT (OUTPATIENT)
Dept: CT IMAGING | Facility: CLINIC | Age: 53
End: 2023-09-10
Attending: EMERGENCY MEDICINE
Payer: COMMERCIAL

## 2023-09-10 VITALS
HEART RATE: 64 BPM | SYSTOLIC BLOOD PRESSURE: 102 MMHG | DIASTOLIC BLOOD PRESSURE: 79 MMHG | OXYGEN SATURATION: 100 % | RESPIRATION RATE: 20 BRPM | TEMPERATURE: 96.9 F

## 2023-09-10 DIAGNOSIS — R10.9 RIGHT FLANK PAIN: ICD-10-CM

## 2023-09-10 LAB
ALBUMIN SERPL BCG-MCNC: 3.7 G/DL (ref 3.5–5.2)
ALBUMIN UR-MCNC: NEGATIVE MG/DL
ALP SERPL-CCNC: 73 U/L (ref 35–104)
ALT SERPL W P-5'-P-CCNC: 16 U/L (ref 0–50)
ANION GAP SERPL CALCULATED.3IONS-SCNC: 7 MMOL/L (ref 7–15)
APPEARANCE UR: CLEAR
AST SERPL W P-5'-P-CCNC: 24 U/L (ref 0–45)
BACTERIA #/AREA URNS HPF: ABNORMAL /HPF
BASOPHILS # BLD AUTO: 0 10E3/UL (ref 0–0.2)
BASOPHILS NFR BLD AUTO: 1 %
BILIRUB SERPL-MCNC: <0.2 MG/DL
BILIRUB UR QL STRIP: NEGATIVE
BUN SERPL-MCNC: 8.3 MG/DL (ref 6–20)
CALCIUM SERPL-MCNC: 9.1 MG/DL (ref 8.6–10)
CHLORIDE SERPL-SCNC: 104 MMOL/L (ref 98–107)
COLOR UR AUTO: ABNORMAL
CREAT SERPL-MCNC: 0.64 MG/DL (ref 0.51–0.95)
DEPRECATED HCO3 PLAS-SCNC: 26 MMOL/L (ref 22–29)
EGFRCR SERPLBLD CKD-EPI 2021: >90 ML/MIN/1.73M2
EOSINOPHIL # BLD AUTO: 0.2 10E3/UL (ref 0–0.7)
EOSINOPHIL NFR BLD AUTO: 3 %
ERYTHROCYTE [DISTWIDTH] IN BLOOD BY AUTOMATED COUNT: 13.5 % (ref 10–15)
GLUCOSE SERPL-MCNC: 105 MG/DL (ref 70–99)
GLUCOSE UR STRIP-MCNC: NEGATIVE MG/DL
HCT VFR BLD AUTO: 35.5 % (ref 35–47)
HGB BLD-MCNC: 11.4 G/DL (ref 11.7–15.7)
HGB UR QL STRIP: ABNORMAL
HOLD SPECIMEN: NORMAL
IMM GRANULOCYTES # BLD: 0 10E3/UL
IMM GRANULOCYTES NFR BLD: 0 %
KETONES UR STRIP-MCNC: NEGATIVE MG/DL
LEUKOCYTE ESTERASE UR QL STRIP: NEGATIVE
LIPASE SERPL-CCNC: 36 U/L (ref 13–60)
LYMPHOCYTES # BLD AUTO: 2.5 10E3/UL (ref 0.8–5.3)
LYMPHOCYTES NFR BLD AUTO: 40 %
MCH RBC QN AUTO: 27.2 PG (ref 26.5–33)
MCHC RBC AUTO-ENTMCNC: 32.1 G/DL (ref 31.5–36.5)
MCV RBC AUTO: 85 FL (ref 78–100)
MONOCYTES # BLD AUTO: 0.5 10E3/UL (ref 0–1.3)
MONOCYTES NFR BLD AUTO: 8 %
NEUTROPHILS # BLD AUTO: 3.1 10E3/UL (ref 1.6–8.3)
NEUTROPHILS NFR BLD AUTO: 48 %
NITRATE UR QL: NEGATIVE
NRBC # BLD AUTO: 0 10E3/UL
NRBC BLD AUTO-RTO: 0 /100
PH UR STRIP: 7 [PH] (ref 5–7)
PLATELET # BLD AUTO: 271 10E3/UL (ref 150–450)
POTASSIUM SERPL-SCNC: 3.7 MMOL/L (ref 3.4–5.3)
PROT SERPL-MCNC: 6.7 G/DL (ref 6.4–8.3)
RBC # BLD AUTO: 4.19 10E6/UL (ref 3.8–5.2)
RBC URINE: 23 /HPF
SODIUM SERPL-SCNC: 137 MMOL/L (ref 136–145)
SP GR UR STRIP: 1 (ref 1–1.03)
SQUAMOUS EPITHELIAL: 1 /HPF
UROBILINOGEN UR STRIP-MCNC: NORMAL MG/DL
WBC # BLD AUTO: 6.4 10E3/UL (ref 4–11)
WBC URINE: 1 /HPF

## 2023-09-10 PROCEDURE — 74177 CT ABD & PELVIS W/CONTRAST: CPT

## 2023-09-10 PROCEDURE — 85025 COMPLETE CBC W/AUTO DIFF WBC: CPT | Performed by: EMERGENCY MEDICINE

## 2023-09-10 PROCEDURE — 36415 COLL VENOUS BLD VENIPUNCTURE: CPT | Performed by: EMERGENCY MEDICINE

## 2023-09-10 PROCEDURE — 83690 ASSAY OF LIPASE: CPT | Performed by: EMERGENCY MEDICINE

## 2023-09-10 PROCEDURE — 80053 COMPREHEN METABOLIC PANEL: CPT | Performed by: EMERGENCY MEDICINE

## 2023-09-10 PROCEDURE — 81001 URINALYSIS AUTO W/SCOPE: CPT | Performed by: EMERGENCY MEDICINE

## 2023-09-10 PROCEDURE — 250N000011 HC RX IP 250 OP 636: Performed by: EMERGENCY MEDICINE

## 2023-09-10 RX ORDER — IOPAMIDOL 755 MG/ML
500 INJECTION, SOLUTION INTRAVASCULAR ONCE
Status: COMPLETED | OUTPATIENT
Start: 2023-09-10 | End: 2023-09-10

## 2023-09-10 RX ADMIN — IOPAMIDOL 80 ML: 755 INJECTION, SOLUTION INTRAVENOUS at 01:27

## 2023-09-10 ASSESSMENT — ACTIVITIES OF DAILY LIVING (ADL): ADLS_ACUITY_SCORE: 35

## 2023-09-10 NOTE — ED TRIAGE NOTES
Pt presents to the ED with  stating she has right flank pain 9/10 since yesterday. Denies urinary problems.      Triage Assessment       Row Name 09/09/23 [id number]       Triage Assessment (Adult)    Airway WDL WDL       Respiratory WDL    Respiratory WDL WDL       Skin Circulation/Temperature WDL    Skin Circulation/Temperature WDL WDL       Cardiac WDL    Cardiac WDL WDL       Peripheral/Neurovascular WDL    Peripheral Neurovascular WDL WDL       Cognitive/Neuro/Behavioral WDL    Cognitive/Neuro/Behavioral WDL WDL

## 2023-09-10 NOTE — ED PROVIDER NOTES
History     Chief Complaint:  Flank Pain       TIMA Elliott is a 53 year old female who presents to the emergency department with right-sided flank pain that began yesterday.  Denies any dysuria, increased frequency or urgency.  However she does note some blood in her urine but notes that she is currently on her menstrual period.  She also explains that she has some pain along her right side when taking a deep breath.  She denies any significant shortness of breath, cough, fever or chills.  She denies any nausea, vomiting or diarrhea.  She has never had pain like this before in the past.  Denies any personal history of kidney stones.  Denies any recent history of prolonged travel, surgeries or periods of immobilization.  No significant pain or swelling in the lower extremities.  She took some ibuprofen prior to coming to the emergency department with significant improvement of her pain.    Independent Historian:   Patient and  report the above history      Medications:    meclizine (ANTIVERT) 25 MG tablet      Past Medical History:    Past Medical History:   Diagnosis Date    Anemia, iron deficiency     Vertigo        Past Surgical History:    No past surgical history on file.     Physical Exam   Patient Vitals for the past 24 hrs:   BP Temp Temp src Pulse Resp SpO2   09/10/23 0220 102/79 -- -- 64 -- 100 %   09/09/23 2343 114/75 96.9  F (36.1  C) Temporal 77 20 100 %        Physical Exam  General: Patient is awake, alert  Head: The scalp, face, and head appear normal  Neck: Normal range of motion.   CV: Regular rate and rhythm.   Resp: Lungs are clear without wheezes or rales. No respiratory distress.   GI: Abdomen is soft, no rigidity, guarding, or rebound. No distension. No tenderness to palpation in any quadrant.    MS: Normal tone. Joints grossly normal without effusions. No asymmetric leg swelling, calf or thigh tenderness.    Skin: No rash or lesions noted. Normal capillary refill noted  Neuro:  Speech is normal and fluent. Face is symmetric. Moving all extremities.   Psych:  Normal affect.  Appropriate interactions.       Emergency Department Course     Imaging:  CT Chest (PE) Abdomen Pelvis w Contrast   Final Result   IMPRESSION:    1.  No acute abnormality identified in the chest, abdomen or pelvis.   2.  No visualized pulmonary embolus.   3.  Nonspecific relative prominence in size of the left ovary, which contains a 3 cm simple-appearing cyst that is most likely a functional cyst.          Report per radiology    Laboratory:  Labs Ordered and Resulted from Time of ED Arrival to Time of ED Departure   ROUTINE UA WITH MICROSCOPIC REFLEX TO CULTURE - Abnormal       Result Value    Color Urine Straw      Appearance Urine Clear      Glucose Urine Negative      Bilirubin Urine Negative      Ketones Urine Negative      Specific Gravity Urine 1.005      Blood Urine Large (*)     pH Urine 7.0      Protein Albumin Urine Negative      Urobilinogen Urine Normal      Nitrite Urine Negative      Leukocyte Esterase Urine Negative      Bacteria Urine Few (*)     RBC Urine 23 (*)     WBC Urine 1      Squamous Epithelials Urine 1     COMPREHENSIVE METABOLIC PANEL - Abnormal    Sodium 137      Potassium 3.7      Chloride 104      Carbon Dioxide (CO2) 26      Anion Gap 7      Urea Nitrogen 8.3      Creatinine 0.64      Calcium 9.1      Glucose 105 (*)     Alkaline Phosphatase 73      AST 24      ALT 16      Protein Total 6.7      Albumin 3.7      Bilirubin Total <0.2      GFR Estimate >90     CBC WITH PLATELETS AND DIFFERENTIAL - Abnormal    WBC Count 6.4      RBC Count 4.19      Hemoglobin 11.4 (*)     Hematocrit 35.5      MCV 85      MCH 27.2      MCHC 32.1      RDW 13.5      Platelet Count 271      % Neutrophils 48      % Lymphocytes 40      % Monocytes 8      % Eosinophils 3      % Basophils 1      % Immature Granulocytes 0      NRBCs per 100 WBC 0      Absolute Neutrophils 3.1      Absolute Lymphocytes 2.5       Absolute Monocytes 0.5      Absolute Eosinophils 0.2      Absolute Basophils 0.0      Absolute Immature Granulocytes 0.0      Absolute NRBCs 0.0     LIPASE - Normal    Lipase 36        Emergency Department Course & Assessments:  Interventions:  Medications   sodium chloride (PF) 0.9% PF flush 100 mL (65 mLs Intravenous $Given 9/10/23 0127)   iopamidol (ISOVUE-370) solution 500 mL (80 mLs Intravenous $Given 9/10/23 0127)      Disposition:  The patient was discharged to home.     Impression & Plan      Medical Decision Making:  Frankie Elliott is a 53 year old female who presents with right flank pain.  Associated symptoms include pain with deep breathing along the right side.  A broad differential diagnosis was considered including diverticulitis, ureterolithiasis, tumor, colitis, cholecystitis, aneurysm, dissection, hydronephrosis, pneumonia, rib fracture, UTI, pyelonephritis amongst many other etiologies.  I doubt PE given negative CT scan.The workup in the ED is at this point negative.  Urinalysis shows hematuria likely consistent with her menstrual cycle.  CT scan also shows a left-sided functional cyst which likely represents a corpus luteum consistent with her menstrual period as well.  She has no significant tenderness along the left side of her abdomen.  No emergent indication for pelvic ultrasound at this juncture.  No etiology for the patients pain is found at this point and my suspicion of an intraabdominal or intrathoracic catastrophe or other worrisome etiology is very low.  I will not therefore admit for serial exams and further workup.  Patient is hemodynamically stable in ED. Plan is home with flank pain recheck by primary care physician or return to ED at that time.  Return for fevers greater than 102, increasing pain, other new symptoms develop.     Diagnosis:    ICD-10-CM    1. Right flank pain  R10.9            MD Tony Ceballos, Jin Lester MD  09/10/23 0501